# Patient Record
Sex: MALE
[De-identification: names, ages, dates, MRNs, and addresses within clinical notes are randomized per-mention and may not be internally consistent; named-entity substitution may affect disease eponyms.]

---

## 2021-12-31 ENCOUNTER — HOSPITAL ENCOUNTER (EMERGENCY)
Dept: HOSPITAL 97 - ER | Age: 71
Discharge: HOME | End: 2021-12-31
Payer: COMMERCIAL

## 2021-12-31 VITALS — OXYGEN SATURATION: 100 % | DIASTOLIC BLOOD PRESSURE: 70 MMHG | SYSTOLIC BLOOD PRESSURE: 176 MMHG

## 2021-12-31 VITALS — TEMPERATURE: 97.8 F

## 2021-12-31 DIAGNOSIS — U07.1: Primary | ICD-10-CM

## 2021-12-31 DIAGNOSIS — I10: ICD-10-CM

## 2021-12-31 LAB
ALBUMIN SERPL BCP-MCNC: 3.6 G/DL (ref 3.4–5)
ALP SERPL-CCNC: 66 U/L (ref 45–117)
ALT SERPL W P-5'-P-CCNC: 42 U/L (ref 12–78)
AST SERPL W P-5'-P-CCNC: 18 U/L (ref 15–37)
BUN BLD-MCNC: 16 MG/DL (ref 7–18)
GLUCOSE SERPLBLD-MCNC: 121 MG/DL (ref 74–106)
HCT VFR BLD CALC: 40.2 % (ref 39.6–49)
INR BLD: 0.97
LYMPHOCYTES # SPEC AUTO: 1.1 K/UL (ref 0.7–4.9)
MAGNESIUM SERPL-MCNC: 2.3 MG/DL (ref 1.8–2.4)
NT-PROBNP SERPL-MCNC: 62 PG/ML (ref ?–125)
PMV BLD: 7.3 FL (ref 7.6–11.3)
POTASSIUM SERPL-SCNC: 4.5 MMOL/L (ref 3.5–5.1)
RBC # BLD: 4.16 M/UL (ref 4.33–5.43)
TROPONIN (EMERG DEPT USE ONLY): < 0.02 NG/ML (ref 0–0.04)

## 2021-12-31 PROCEDURE — 84484 ASSAY OF TROPONIN QUANT: CPT

## 2021-12-31 PROCEDURE — 83735 ASSAY OF MAGNESIUM: CPT

## 2021-12-31 PROCEDURE — 85025 COMPLETE CBC W/AUTO DIFF WBC: CPT

## 2021-12-31 PROCEDURE — 83880 ASSAY OF NATRIURETIC PEPTIDE: CPT

## 2021-12-31 PROCEDURE — 71275 CT ANGIOGRAPHY CHEST: CPT

## 2021-12-31 PROCEDURE — 99283 EMERGENCY DEPT VISIT LOW MDM: CPT

## 2021-12-31 PROCEDURE — 80048 BASIC METABOLIC PNL TOTAL CA: CPT

## 2021-12-31 PROCEDURE — 85379 FIBRIN DEGRADATION QUANT: CPT

## 2021-12-31 PROCEDURE — 71045 X-RAY EXAM CHEST 1 VIEW: CPT

## 2021-12-31 PROCEDURE — 93005 ELECTROCARDIOGRAM TRACING: CPT

## 2021-12-31 PROCEDURE — 85610 PROTHROMBIN TIME: CPT

## 2021-12-31 PROCEDURE — 80076 HEPATIC FUNCTION PANEL: CPT

## 2021-12-31 PROCEDURE — 36415 COLL VENOUS BLD VENIPUNCTURE: CPT

## 2021-12-31 NOTE — RAD REPORT
EXAM DESCRIPTION:  CT - Chest For Pe Angio - 12/31/2021 11:03 am

 

CLINICAL HISTORY:   Chest pain

 

COMPARISON:  None.

 

TECHNIQUE:  Dynamically enhanced axial 3 mm thick images of the chest were obtained during administra
tion of <100> mL Isovue 370 IV contrast. Coronal and oblique reconstruction images were generated and
 reviewed. Exam utilizes a protocol for optimal evaluation of pulmonary arterial tree.

 

Maximum intensity projections 3D imaging was utilized

 

All CT scans are performed using dose optimization technique as appropriate and may include automated
 exposure control or mA/KV adjustment according to patient size.

 

FINDINGS:  A pulmonary embolus is not seen.

 

A thoracic aortic aneurysm is not noted.

 

A pleural effusion is not seen. A pericardial effusion is not seen.

 

Mild to moderate ground-glass opacities right upper lobe. Mild ground-glass opacities right lower lob
e. Left lung is clear.

 

Cardiomegaly

 

IMPRESSION:  Negative for a pulmonary embolism.

 

Mild to moderate ground-glass opacities right lung may indicate viral pneumonia

## 2021-12-31 NOTE — EDPHYS
Physician Documentation                                                                           

 [facility]                                                                 

Name: Edwin Nichols                                                                                  

Age: 71 yrs                                                                                       

Sex: Male                                                                                         

: 1950                                                                                   

MRN: [id number]                                                                                   

Arrival Date: 2021                                                                          

Time: 04:27                                                                                       

Account#: [id number]                                                                            

Bed 12                                                                                            

Private MD:                                                                                       

ED Physician Leeroy Cornejo                                                                         

HPI:                                                                                              

                                                                                             

07:10 This 71 yrs old Male presents to ER via Ambulatory with complaints of Cough, High Blood jmm 

      Pressure, COVID+.                                                                           

07:10 The patient or guardian reports cough. Onset: The symptoms/episode began/occurred       jmm 

      gradually, 1 day(s) ago. Modifying factors: The symptoms are alleviated by nothing, the     

      symptoms are aggravated by nothing. Associated signs and symptoms: Pertinent positives:     

      fever. Two 71-year-old male with history of hypertension the presents emerged               

      department with concerns due to an elevated heart rate which awoke him early this           

      morning. Denies chest pain or shortness of breath. Patient states having a fever            

      yesterday and testing positive for covid. .                                                 

                                                                                                  

Historical:                                                                                       

- Allergies:                                                                                      

07:14 No Known Allergies;                                                                     bb  

                                                                                                  

- Immunization history:: Adult Immunizations up to date, Pfizer x 3.                              

- Social history:: Smoking status: Patient denies any tobacco usage or history of.                

                                                                                                  

                                                                                                  

ROS:                                                                                              

07:10 Constitutional: Negative for fever, chills, and weight loss, Cardiovascular: Negative   jmm 

      for chest pain, palpitations, and edema, Respiratory: Negative for shortness of breath,     

      cough, wheezing, and pleuritic chest pain, Abdomen/GI: Negative for abdominal pain,         

      nausea, vomiting, diarrhea, and constipation.                                               

07:10 All other systems are negative.                                                             

                                                                                                  

Exam:                                                                                             

07:10 Constitutional:  This is a well developed, well nourished patient who is awake, alert,  jmm 

      and in no acute distress. Head/Face:  atraumatic. Eyes:  EOMI, no conjunctival erythema     

      appreciated ENT:  Moist Mucus Membranes Neck:  Trachea midline, Supple Chest/axilla:        

      Normal chest wall appearance and motion.                                                    

07:10 Back:  Normal ROM Skin:  General appearance color normal MS/ Extremity:  Moves all          

      extremities, no obvious deformities appreciated, no edema noted to the lower                

      extremities  Neuro:  Awake and alert, normal gait Psych:  Behavior is normal, Mood is       

      normal, Patient is cooperative and pleasant                                                 

07:10 Cardiovascular: Rate: normal, Rhythm: regular, Pulses: no pulse deficits are                

      appreciated.                                                                                

                                                                                                  

Vital Signs:                                                                                      

07:12  / 80; Pulse 58; Resp 16 S; Temp 97.8(O); Pulse Ox 99% on R/A; Weight 92.99 kg    bb  

      (R); Height 5 ft. 11 in. (180.34 cm) (R); Pain 0/10;                                        

09:14  / 71; Pulse 57; Resp 18; Pulse Ox 100% on R/A;                                   ha  

10:25  / 77; Pulse 69; Resp 18; Pulse Ox 99% ;                                          ha  

11:27  / 70; Pulse 74; Resp 18; Pulse Ox 100% ;                                         ha  

07:12 Body Mass Index 28.59 (92.99 kg, 180.34 cm)                                             bb  

                                                                                                  

MDM:                                                                                              

09:27 Patient medically screened.                                                             [facility] 

11:40 Data reviewed: vital signs, nurses notes. Counseling: I had a detailed discussion with  shantel 

      the patient and/or guardian regarding: the historical points, exam findings, and any        

      diagnostic results supporting the discharge/admit diagnosis, lab results, radiology         

      results, the need for outpatient follow up, to return to the emergency department if        

      symptoms worsen or persist or if there are any questions or concerns that arise at home.    

                                                                                                  

                                                                                             

09:27 Order name: Basic Metabolic Panel; Complete Time: 10:15                                 [facility] 

                                                                                             

09:27 Order name: CBC with Diff; Complete Time: 09:55                                         [facility] 

                                                                                             

09:27 Order name: LFT's; Complete Time: 10:15                                                 [facility] 

                                                                                             

09:27 Order name: Magnesium; Complete Time: 10:15                                             [facility] 

                                                                                             

09:27 Order name: NT PRO-BNP; Complete Time: 10:15                                            [facility] 

                                                                                             

09:27 Order name: PT-INR; Complete Time: 09:55                                                [facility] 

                                                                                             

07:10 Order name: XRAY Chest (1 view); Complete Time: 09:27                                   rn  

                                                                                             

09:27 Order name: Troponin (emerg Dept Use Only); Complete Time: 10:15                        [facility] 

                                                                                             

09:27 Order name: EKG; Complete Time: 09:28                                                   [facility] 

                                                                                             

09:27 Order name: Cardiac monitoring; Complete Time: 10:24                                    [facility] 

                                                                                             

09:27 Order name: EKG - Nurse/Tech; Complete Time: 10:24                                      [facility] 

                                                                                             

09:27 Order name: IV Saline Lock; Complete Time: :40                                        [facility] 

                                                                                             

10:15 Order name: D-Dimer; Complete Time: 10:                                               [facility] 

                                                                                             

10:32 Order name: CT Chest For PE Angio; Complete Time: 11:19                                 [facility] 

                                                                                             

09:27 Order name: Labs collected and sent; Complete Time: :40                               [facility] 

                                                                                             

09:27 Order name: O2 Per Protocol; Complete Time: :40                                       [facility] 

                                                                                             

09:27 Order name: O2 Sat Monitoring; Complete Time: :40                                     [facility] 

                                                                                                  

Administered Medications:                                                                         

No medications were administered                                                                  

                                                                                                  

                                                                                                  

Disposition:                                                                                      

18:01 Co-signature as Attending Physician, Leeroy Cornejo MD I agree with the assessment and     rn  

      plan of care. Attestation: The patient's history, exam findings, diagnostics, and a         

      summary of any interventions or procedures was reviewed in detail with Candelario JOE.     

                                                                                                  

Disposition Summary:                                                                              

21 11:40                                                                                    

Discharge Ordered                                                                                 

      Location: Home                                                                          [facility] 

      Condition: Stable                                                                       [facility] 

      Diagnosis                                                                                   

        - Coronavirus infection, unspecified                                                  [facility] 

      Followup:                                                                               [facility] 

        - With: Private Physician                                                                  

        - When: 2 - 3 days                                                                         

        - Reason: Recheck today's complaints, Continuance of care, Re-evaluation by your           

      physician                                                                                   

      Discharge Instructions:                                                                     

        - Discharge Summary Sheet                                                             [facility] 

        - COVID-19                                                                            [facility] 

      Forms:                                                                                      

        - Medication Reconciliation Form                                                      [facility] 

        - Thank You Letter                                                                    [facility] 

        - Antibiotic Education                                                                [facility] 

        - Prescription Opioid Use                                                             [facility] 

Signatures:                                                                                       

Dispatcher [employer]                           Candelario Pollack PA PA jmm Ballard, Brenda, RN                     RN   Leeroy Montgomery MD MD   rn                                                   

                                                                                                  

**************************************************************************************************

## 2021-12-31 NOTE — XMS REPORT
Continuity of Care Document

                          Created on:2021



Patient:FRANK SANTIAGO

Sex:Male

:1950

External Reference #:[id number]





Demographics







                          Address                   [address] [place] [address]

 

                          Home Phone                [phone]

 

                          Mobile Phone              [phone]

 

                          Email Address             [email or web address]

 

                          Preferred Language        en

 

                          Marital Status            Unknown

 

                          Samaritan Affiliation     Unknown

 

                          Race                      Unknown

 

                          Additional Race(s)        White

 

                          Ethnic Group              Not  or 









Author







                          Organization              [facility]

t

 

                          Address                   [address]



                                                    [place] [address]

 

                          Phone                     [phone]









Support







                Name            Relationship    Address         Phone

 

                Magdalena           Unavailable     [address] [facility] [phone]



                                                [place] [address] 

 

                Magdalena           Unavailable     [address] [facility] [phone]



                                                [place] [address] 









Care Team Providers







                    Name                Role                Phone

 

                    Pcp,  Does Not Have A Primary Care Physician [phone]

 

                    Nurse,  Ki Immunization Attending Clinician Unavailable

 

                    Mayo Bravo DO   Attending Clinician [phone]









Payers







           Payer Name Policy Type Policy Number Effective Date Expiration Date S

ource







Problems

This patient has no known problems.



Allergies, Adverse Reactions, Alerts

This patient has no known allergies or adverse reactions.



Social History







           Social Habit Start Date Stop Date  Quantity   Comments   Source

 

           Sex Assigned At 1950                       [facility]



           Birth      00:00:00   00:00:00                         [facility]









                Smoking Status  Start Date      Stop Date       Source

 

                Unknown if ever smoked                                 [facility] [place]







Medications







       Ordered Filled Start  Stop   Current Ordering Indication Dosage Frequency

 Signature

                    Comments            Components          Source



     Medication Medication Date Date Medication? Clinician                (SIG) 

          



     Name Name                                                   

 

     [facility] 2019 2020- No   Tejal                1              [facility]



               -27      Millender                applicatio           Mario

es -



               00:00: 00:00                          n to           [facility]



               00   :00                           affected           l



                                                  toenail(s)           Outpati



                                                                 ent



                                                                 Clinics

 

     Daily Daily           Yes  Tejal                1 tablet           [facility]



     Multiple Multiple                Millender                               Marla

kes -



     Vitamins Vitamins                                                   [facility]



                                                                 l



                                                                 Outpati



                                                                 ent



                                                                 Clinics

 

     Simvastatin Simvastatin           Yes  Tejal                1 tablet       

    [facility]



                              [facility]                               [facility]



                                                                 [facility]



                                                                 l



                                                                 Outpati



                                                                 ent



                                                                 Clinics

 

     Lisinopril Lisinopril           Yes  Tejal                1 tablet         

  [facility]



                              [facility]                               [facility]



                                                                 [facility]



                                                                 l



                                                                 Outpati



                                                                 ent



                                                                 Clinics







Immunizations







           Ordered    Filled Immunization Date       Status     Comments   Sourc

e



           Immunization Name Name                                        

 

           SARS-COV-2 COVID-19            2021 Completed             [facility]

[facility]



           PFIZER VACCINE            00:00:00                         [facility]

 

           SARS-COV-2 COVID-19            2021 Completed             [facility]

rs[facility] of



           PFIZER VACCINE            00:00:00                         [facility]

 

           SARS-COV-2 COVID-19            2021-02-10 Completed             [facility]

rsity of



           PFIZER VACCINE            00:00:00                         [facility]







Procedures







                Procedure       Date / Time Performed Performing Clinician Sour

e

 

                SARS-COV-2 COVID-19 2021 18:03:16 Doctor Unassigned, No [facility] 

[place]



                VACCINE,0.3ML,IM                 Name            [facility]



                (PFIZER)                                        







Encounters







        Start   End     Encounter Admission Attending Care    Care    Encounter 

Source



        Date/Time Date/Time Type    Type    Clinicians Facility Department ID   

   

 

        2021 Imm/Inj         Nurse, Adc Pob Immunization [facility]  

  [id number] 

[id number]                                [facility]



        11:00:06 11:00:20 Visit           Ortega Bravo [id number]

.10         [facility] [id number]         Kim SHABAZZ [phone]         Me

[address]                 

 

        2021-10-07 [facility]07 [id number]



        00[facility]00[facility]00 [facility]00[facility]00                                                 [facility]



                                                                        [facility]



                                                                        [facility]pati



                                                                        ent



                                                                        Clinics

 

        20[facility]  [id number]

 [facility]



        00:00:00 00:00:00                                                 [facility]



                                                                        [facility]



                                                                        Outpati



                                                                        ent



                                                                        Clinics

 

        [id number]

 [facility]



        00:00[facility]00 00[facility]00:00                                                 [facility]



                                                                        [facility]



                                                                        l



                                                                        Outpati



                                                                        ent



                                                                        Clinics

 

        20[facility]  [id number]

 [facility]



        00:00[facility]00 00:00:00                                                 [facility]



                                                                        [facility]



                                                                        l



                                                                        Outpati



                                                                        ent



                                                                        Clinics

 

        2020 [facility]  [id number]

 [facility]



        00:00[facility]00 00[facility]00:00                                                 [facility]



                                                                        [facility]



                                                                        [facility]



                                                                        Outpati



                                                                        ent



                                                                        Clinics

 

        2020 Outpatient                 [facility]

[id number] [facility]



        08:00:00 08:00:00                         t [facility]



                                                Medicine                 Outpati



                                                                        ent



                                                                        Clinics

 

        2019 Outpatient                 [facility]

[id number] [facility]



        23:49:00 23:49:00                         [facility]



                                                Medicine                 Outpati



                                                                        ent



                                                                        Clinics

 

        2019 Outpatient                 [facility]

[id number] [facility]



        08:00:00 08:00:00                         [facility]



                                                Medicine                 Outpati



                                                                        ent



                                                                        Clinics

 

        2019-10-01 2019-10-01 Outpatient                 [facility]

[id number] [facility]



        13:00:00 13:00:00                         [facility]



                                                Medicine                 Outpati



                                                                        ent



                                                                        Clinics

 

        2019 Outpatient                 [facility]

[id number] [facility]



        16:15:00 16:15:00                         [facility]



                                                Medicine                 Outpati



                                                                        ent



                                                                        Clinics

 

        2018 Outpatient                 [facility]

[id number] [facility]



        08:30:00 08:30:00                         [facility]



                                                Medicine                 Outpati



                                                                        ent



                                                                        Clinics







Results

This patient has no known results.

## 2021-12-31 NOTE — ER
Nurse's Notes                                                                                     

 [facility]                                                                 

Name: Edwin Nichols                                                                                  

Age: 71 yrs                                                                                       

Sex: Male                                                                                         

: 1950                                                                                   

MRN: [id number]                                                                                   

Arrival Date: 2021                                                                          

Time: 04:27                                                                                       

Account#: [id number]                                                                            

Bed 12                                                                                            

Private MD:                                                                                       

Diagnosis: Coronavirus infection, unspecified                                                     

                                                                                                  

Presentation:                                                                                     

                                                                                             

07:12 Chief complaint: Patient states: he was diagnosed with Covid on Wednesday this morning  bb  

      his pulse went to 111 so he became concerned because it is normally in the 60s.             

      Coronavirus screen: Client reports previous positive COVID test result. Ebola Screen:       

      No symptoms or risks identified at this time. Initial Sepsis Screen: Does the patient       

      meet any 2 criteria? No. Patient's initial sepsis screen is negative. Does the patient      

      have a suspected source of infection? No. Patient's initial sepsis screen is negative.      

      Risk Assessment: Do you want to hurt yourself or someone else? Patient reports no           

      desire to harm self or others. Onset of symptoms was 2021.                     

07:12 Method Of Arrival: Ambulatory                                                           bb  

07:12 Acuity: KATIUSKA 5                                                                           bb  

                                                                                                  

Triage Assessment:                                                                                

09:14 General: Appears in no apparent distress. Behavior is calm, cooperative.                ha  

                                                                                                  

Historical:                                                                                       

- Allergies:                                                                                      

07:14 No Known Allergies;                                                                     bb  

                                                                                                  

- Immunization history:: Adult Immunizations up to date, Pfizer x 3.                              

- Social history:: Smoking status: Patient denies any tobacco usage or history of.                

                                                                                                  

                                                                                                  

Screenin:13 Abuse screen: Denies threats or abuse. Denies injuries from another. Nutritional        ha  

      screening: No deficits noted. Tuberculosis screening: No symptoms or risk factors           

      identified. Fall Risk None identified.                                                      

                                                                                                  

Assessment:                                                                                       

09:13 Pain: Denies pain.                                                                      ha  

                                                                                                  

Vital Signs:                                                                                      

07:12  / 80; Pulse 58; Resp 16 S; Temp 97.8(O); Pulse Ox 99% on R/A; Weight 92.99 kg    bb  

      (R); Height 5 ft. 11 in. (180.34 cm) (R); Pain 0/10;                                        

09:14  / 71; Pulse 57; Resp 18; Pulse Ox 100% on R/A;                                   ha  

10:25  / 77; Pulse 69; Resp 18; Pulse Ox 99% ;                                          ha  

11:27  / 70; Pulse 74; Resp 18; Pulse Ox 100% ;                                         ha  

07:12 Body Mass Index 28.59 (92.99 kg, 180.34 cm)                                               

                                                                                                  

ED Course:                                                                                        

04:27 Patient arrived in ED.                                                                  ja2 

07:14 Triage completed.                                                                       bb  

07:14 Arm band placed on Patient placed in waiting room, Patient notified of wait time.       bb  

08:44 XRAY Chest (1 view) In Process Unspecified.                                             EDMS

08:55 Candelario Pressley PA is PHCP.                                                              [facility] 

08:55 Leeroy Cornejo MD is Attending Physician.                                                jmm 

09:13 Patient has correct armband on for positive identification.                             ha  

09:13 No provider procedures requiring assistance completed.                                  ha  

09:40 Basic Metabolic Panel Sent.                                                             ha  

09:40 CBC with Diff Sent.                                                                     ha  

09:40 LFT's Sent.                                                                             ha  

09:40 Magnesium Sent.                                                                         ha  

09:40 NT PRO-BNP Sent.                                                                        ha  

09:40 PT-INR Sent.                                                                            ha  

09:40 Troponin (emerg Dept Use Only) Sent.                                                    ha  

11:03 CT Chest For PE Angio In Process Unspecified.                                           EDMS

11:50 IV discontinued, intact, Pressure dressing applied.                                     ha  

                                                                                                  

Administered Medications:                                                                         

No medications were administered                                                                  

                                                                                                  

                                                                                                  

Outcome:                                                                                          

11:40 Discharge ordered by MD.                                                                [facility] 

11:50 Discharged to home                                                                      ha  

11:50 Condition: good                                                                             

11:50 Discharge instructions given to patient.                                                    

11:50 Patient left the ED.                                                                    ha  

                                                                                                  

Signatures:                                                                                       

Dispatcher [employer]                           EDMS                                                 

Candelario Pressley PA PA jmm Ballard, Brenda, RN                     RN   Nel Collins Heather, RN                  RN   lu                                                   

                                                                                                  

**************************************************************************************************

## 2021-12-31 NOTE — RAD REPORT
EXAM DESCRIPTION:  Roverto Single View12/31/2021 8:43 am

 

CLINICAL HISTORY:  cough

 

COMPARISON:  2020

 

FINDINGS:  Upper lobe vessel prominent Killian did of pulmonary venous hypertension.

 

 The lungs appear clear of acute infiltrate. The heart is mildly enlarged